# Patient Record
Sex: MALE | Race: BLACK OR AFRICAN AMERICAN | NOT HISPANIC OR LATINO | URBAN - METROPOLITAN AREA
[De-identification: names, ages, dates, MRNs, and addresses within clinical notes are randomized per-mention and may not be internally consistent; named-entity substitution may affect disease eponyms.]

---

## 2023-09-08 DIAGNOSIS — K40.90 RIGHT INGUINAL HERNIA: Primary | ICD-10-CM

## 2023-10-10 ENCOUNTER — OFFICE VISIT (OUTPATIENT)
Dept: SURGERY | Facility: CLINIC | Age: 38
End: 2023-10-10

## 2023-10-10 VITALS
HEART RATE: 58 BPM | SYSTOLIC BLOOD PRESSURE: 146 MMHG | HEIGHT: 66 IN | RESPIRATION RATE: 18 BRPM | DIASTOLIC BLOOD PRESSURE: 74 MMHG | BODY MASS INDEX: 20.44 KG/M2 | TEMPERATURE: 98 F | WEIGHT: 127.19 LBS | OXYGEN SATURATION: 97 %

## 2023-10-10 DIAGNOSIS — K40.90 RIGHT INGUINAL HERNIA: ICD-10-CM

## 2023-10-10 PROCEDURE — 99215 OFFICE O/P EST HI 40 MIN: CPT | Mod: PBBFAC

## 2023-10-10 RX ORDER — SODIUM CHLORIDE 9 MG/ML
INJECTION, SOLUTION INTRAVENOUS CONTINUOUS
Status: CANCELLED | OUTPATIENT
Start: 2023-10-10

## 2023-10-10 RX ORDER — CEFAZOLIN SODIUM 2 G/50ML
2 SOLUTION INTRAVENOUS
Status: CANCELLED | OUTPATIENT
Start: 2023-10-10

## 2023-10-10 RX ORDER — HEPARIN SODIUM 5000 [USP'U]/ML
5000 INJECTION, SOLUTION INTRAVENOUS; SUBCUTANEOUS ONCE
Status: CANCELLED | OUTPATIENT
Start: 2023-10-10

## 2023-10-10 NOTE — PROGRESS NOTES
Pt seen by Dr. Briggs; Scheduled for right inguinal hernia repair on 18Oct2023; Pt consented for surgery; TRES wipes & instruction sheet explained & given to pt; Pt instructed to return to clinic 2 weeks after surgery for post op appt; Discharge paperwork given w/pt verbalizing understanding; Pt escorted to financial building

## 2023-10-10 NOTE — H&P (VIEW-ONLY)
[facility] General [facility] Clinic Note    HPI: Demarcus Ayala is a 38 year old male with no significant past medical history who presents for evaluation of right inguinal hernia. 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 It has progressively gotten larger since then. He is able to reduce it by lying down and applying pressure. He reports some mild discomfort from his pants. No changes in appetite or bowel movements. No abdominal surgery before. Smokes 3 cigars per week but thinks he can quit. Does report left testicular enlargement that he has not seen any doctor for. Report RUQ pain with spicy foods about twice per week.     PMH: History reviewed. No pertinent past medical history.   Meds: No current outpatient medications on file.  Allergies:   Review of patient's allergies indicates:   Allergen Reactions    Tramadol Nausea Only     Social History:   Social History     Tobacco Use    Smoking status: Every Day     Types: Cigars, Cigarettes    Smokeless tobacco: Never    Tobacco comments:     Black & milds-3 per week   Substance Use Topics    Alcohol use: Yes     Comment: rarely    Drug use: Not Currently     Family History: History reviewed. No pertinent family history.  Surgical History: History reviewed. No pertinent surgical history.  Review of Systems:  Skin: No rashes or itching.  Head: Denies headache or recent trauma.  Eyes: Denies eye pain or double vision.  Neck: Denies swelling or hoarseness of voice.  Respiratory: Denies shortness of breath or chest pain  Cardiac: Denies palpitations or swelling in hands/feet.  Gastrointestinal: Denies nausea, denies vomiting.   Urinary: Denies dysuria or hematuria.  Vascular: Denies claudication or leg swelling.  Neuro: Denies motor deficits. Denies weakness.  Endocrine: Denies excessive sweating or cold intolerance.  Psych: Denies memory problems. Denies anxiety.    Objective:    Vitals:  Vitals:    10/10/23 0959   BP: (!) 146/74   Pulse: (!)  58   Resp: 18   Temp: 97.5 °F (36.4 °C)      Physical Exam:  Gen: NAD  Neuro: awake, alert, answering questions appropriately  CV: RRR  Resp: non-labored breathing, ROXANN  Abd: soft, ND, NT  : tangerine size right inguinal hernia extending into scrotum; easily reducibly, nontender  Ext: moves all 4 spontaneously and purposefully  Skin: warm, well perfused    Assessment/Plan:  Patient is a 38 year old male with moderate size right inguinal hernia who presents for evaluation for repair.      - Patient booked and consented for open right inguinal hernia repair 10/18  - NPO midnight prior to surgery  - Consider RUQ US to evaluate for symptomatic cholelithiasis after hernia repair    Flex Briggs MD   [employer] General Surgery PGY 1  10/10/2023 10:57 AM

## 2023-10-10 NOTE — PROGRESS NOTES
[facility] General Surgery Clinic Note    HPI: Demarcus Ayala is a 38 year old male with no significant past medical history who presents for evaluation of right inguinal hernia. 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 It has progressively gotten larger since then. He is able to reduce it by lying down and applying pressure. He reports some mild discomfort from his pants. No changes in appetite or bowel movements. No abdominal surgery before. Smokes 3 cigars per week but thinks he can quit. Does report left testicular enlargement that he has not seen any doctor for. Report RUQ pain with spicy foods about twice per week.     PMH: History reviewed. No pertinent past medical history.   Meds: No current outpatient medications on file.  Allergies:   Review of patient's allergies indicates:   Allergen Reactions    Tramadol Nausea Only     Social History:   Social History     Tobacco Use    Smoking status: Every Day     Types: Cigars, Cigarettes    Smokeless tobacco: Never    Tobacco comments:     Black & milds-3 per week   Substance Use Topics    Alcohol use: Yes     Comment: rarely    Drug use: Not Currently     Family History: History reviewed. No pertinent family history.  Surgical History: History reviewed. No pertinent surgical history.  Review of Systems:  Skin: No rashes or itching.  Head: Denies headache or recent trauma.  Eyes: Denies eye pain or double vision.  Neck: Denies swelling or hoarseness of voice.  Respiratory: Denies shortness of breath or chest pain  Cardiac: Denies palpitations or swelling in hands/feet.  Gastrointestinal: Denies nausea, denies vomiting.   Urinary: Denies dysuria or hematuria.  Vascular: Denies claudication or leg swelling.  Neuro: Denies motor deficits. Denies weakness.  Endocrine: Denies excessive sweating or cold intolerance.  Psych: Denies memory problems. Denies anxiety.    Objective:    Vitals:  Vitals:    10/10/23 0959   BP: (!) 146/74   Pulse: (!)  58   Resp: 18   Temp: 97.5 °F (36.4 °C)      Physical Exam:  Gen: NAD  Neuro: awake, alert, answering questions appropriately  CV: RRR  Resp: non-labored breathing, ROXANN  Abd: soft, ND, NT  : tangerine size right inguinal hernia extending into scrotum; easily reducibly, nontender  Ext: moves all 4 spontaneously and purposefully  Skin: warm, well perfused    Assessment/Plan:  Patient is a 38 year old male with moderate size right inguinal hernia who presents for evaluation for repair.      - Patient booked and consented for open right inguinal hernia repair 10/18  - NPO midnight prior to surgery  - Consider RUQ US to evaluate for symptomatic cholelithiasis after hernia repair    Flex Briggs MD   [employer] General Surgery PGY 1  10/10/2023 10:57 AM

## 2023-10-10 NOTE — PROGRESS NOTES
I have reviewed the notes, assessments, and/or procedures performed by the resident, I concur with her/his documentation of Demarcus Ayala.     Jayda Grant MD

## 2023-10-12 DIAGNOSIS — Q55.9 TESTICULAR ASYMMETRY: Primary | ICD-10-CM

## 2023-10-16 ENCOUNTER — ANESTHESIA EVENT (OUTPATIENT)
Dept: SURGERY | Facility: HOSPITAL | Age: 38
End: 2023-10-16

## 2023-10-16 NOTE — ANESTHESIA PREPROCEDURE EVALUATION
10/16/2023  Demarcus Ayala is a 38 y.o., male without significant PMHx for IHR right.      Pre-op Assessment    I have reviewed the Patient Summary Reports.     I have reviewed the Nursing Notes. I have reviewed the NPO Status.   I have reviewed the Medications.     Review of Systems  Anesthesia Hx:  No problems with previous Anesthesia  Denies Family Hx of Anesthesia complications.   Denies Personal Hx of Anesthesia complications.   Hematology/Oncology:  Hematology Normal   Oncology Normal     EENT/Dental:  EENT/Dental Normal  Ears General/Symptom(s) Ear Disease: Tympanic Membrane Problem   Cardiovascular:  Cardiovascular Normal     Pulmonary:  Pulmonary Normal    Renal/:  Renal/ Normal     Hepatic/GI:  Hepatic/GI Normal    Musculoskeletal:  Musculoskeletal Normal    Neurological:  Neurology Normal    Endocrine:  Endocrine Normal    Dermatological:  Skin Normal    Psych:  Psychiatric Normal           Physical Exam  General: Well nourished    Airway:  Mallampati: II   Mouth Opening: Normal  TM Distance: Normal  Tongue: Normal  Neck ROM: Normal ROM    Dental:Loose lower central incisor  Chest/Lungs:  Clear to auscultation, Normal Respiratory Rate    Heart:  Rate: Normal  Rhythm: Regular Rhythm        Anesthesia Plan  Type of Anesthesia, risks & benefits discussed:    Anesthesia Type: Gen Supraglottic Airway  Intra-op Monitoring Plan: Standard ASA Monitors  Post Op Pain Control Plan: multimodal analgesia and IV/PO Opioids PRN  Induction:  IV  Informed Consent: Informed consent signed with the Patient and all parties understand the risks and agree with anesthesia plan.  All questions answered.   ASA Score: 1  Day of Surgery Review of History & Physical: H&P Update referred to the surgeon/provider.I have interviewed and examined the patient. I have reviewed the patient's H&P dated: There are no significant  changes. H&P completed by Anesthesiologist.    Ready For Surgery From Anesthesia Perspective.     .

## 2023-10-18 ENCOUNTER — ANESTHESIA (OUTPATIENT)
Dept: SURGERY | Facility: HOSPITAL | Age: 38
End: 2023-10-18

## 2023-10-18 ENCOUNTER — HOSPITAL ENCOUNTER (OUTPATIENT)
Facility: HOSPITAL | Age: 38
Discharge: HOME | End: 2023-10-18
Attending: SURGERY | Admitting: SURGERY
Payer: MEDICAID

## 2023-10-18 DIAGNOSIS — K40.90 RIGHT INGUINAL HERNIA: ICD-10-CM

## 2023-10-18 PROCEDURE — 25000003 PHARM REV CODE 250: Performed by: NURSE ANESTHETIST, CERTIFIED REGISTERED

## 2023-10-18 PROCEDURE — 63600175 PHARM REV CODE 636 W HCPCS

## 2023-10-18 PROCEDURE — C1781 MESH (IMPLANTABLE): HCPCS | Performed by: SURGERY

## 2023-10-18 PROCEDURE — 37000008 HC ANESTHESIA 1ST 15 MINUTES: Performed by: SURGERY

## 2023-10-18 PROCEDURE — 88302 TISSUE EXAM BY PATHOLOGIST: CPT | Mod: TC | Performed by: SURGERY

## 2023-10-18 PROCEDURE — 71000033 HC RECOVERY, INTIAL HOUR: Performed by: SURGERY

## 2023-10-18 PROCEDURE — 25000003 PHARM REV CODE 250: Performed by: ANESTHESIOLOGY

## 2023-10-18 PROCEDURE — D9220A PRA ANESTHESIA: ICD-10-PCS | Mod: ,,, | Performed by: ANESTHESIOLOGY

## 2023-10-18 PROCEDURE — D9220A PRA ANESTHESIA: Mod: ,,, | Performed by: ANESTHESIOLOGY

## 2023-10-18 PROCEDURE — 63600175 PHARM REV CODE 636 W HCPCS: Performed by: NURSE ANESTHETIST, CERTIFIED REGISTERED

## 2023-10-18 PROCEDURE — 71000016 HC POSTOP RECOV ADDL HR: Performed by: SURGERY

## 2023-10-18 PROCEDURE — 36000707: Performed by: SURGERY

## 2023-10-18 PROCEDURE — C1729 CATH, DRAINAGE: HCPCS | Performed by: SURGERY

## 2023-10-18 PROCEDURE — 37000009 HC ANESTHESIA EA ADD 15 MINS: Performed by: SURGERY

## 2023-10-18 PROCEDURE — 36000706: Performed by: SURGERY

## 2023-10-18 PROCEDURE — 63600175 PHARM REV CODE 636 W HCPCS: Performed by: ANESTHESIOLOGY

## 2023-10-18 PROCEDURE — 63600175 PHARM REV CODE 636 W HCPCS: Performed by: SURGERY

## 2023-10-18 PROCEDURE — 71000015 HC POSTOP RECOV 1ST HR: Performed by: SURGERY

## 2023-10-18 DEVICE — BARD MESH
Type: IMPLANTABLE DEVICE | Site: ABDOMEN | Status: FUNCTIONAL
Brand: BARD MESH

## 2023-10-18 RX ORDER — HYDROMORPHONE HYDROCHLORIDE 1 MG/ML
INJECTION, SOLUTION INTRAMUSCULAR; INTRAVENOUS; SUBCUTANEOUS
Status: DISCONTINUED | OUTPATIENT
Start: 2023-10-18 | End: 2023-10-18

## 2023-10-18 RX ORDER — KETOROLAC TROMETHAMINE 30 MG/ML
INJECTION, SOLUTION INTRAMUSCULAR; INTRAVENOUS
Status: DISCONTINUED | OUTPATIENT
Start: 2023-10-18 | End: 2023-10-18

## 2023-10-18 RX ORDER — HEPARIN SODIUM 5000 [USP'U]/ML
5000 INJECTION, SOLUTION INTRAVENOUS; SUBCUTANEOUS ONCE
Status: COMPLETED | OUTPATIENT
Start: 2023-10-18 | End: 2023-10-18

## 2023-10-18 RX ORDER — METOCLOPRAMIDE HYDROCHLORIDE 5 MG/ML
10 INJECTION INTRAMUSCULAR; INTRAVENOUS ONCE AS NEEDED
Status: ACTIVE | OUTPATIENT
Start: 2023-10-18 | End: 2035-03-16

## 2023-10-18 RX ORDER — DROPERIDOL 2.5 MG/ML
0.25 INJECTION, SOLUTION INTRAMUSCULAR; INTRAVENOUS ONCE AS NEEDED
Status: ACTIVE | OUTPATIENT
Start: 2023-10-18 | End: 2035-03-16

## 2023-10-18 RX ORDER — BUPIVACAINE HYDROCHLORIDE 2.5 MG/ML
INJECTION, SOLUTION EPIDURAL; INFILTRATION; INTRACAUDAL
Status: DISCONTINUED | OUTPATIENT
Start: 2023-10-18 | End: 2023-10-18 | Stop reason: HOSPADM

## 2023-10-18 RX ORDER — FENTANYL CITRATE 50 UG/ML
INJECTION, SOLUTION INTRAMUSCULAR; INTRAVENOUS
Status: DISCONTINUED | OUTPATIENT
Start: 2023-10-18 | End: 2023-10-18

## 2023-10-18 RX ORDER — ONDANSETRON 2 MG/ML
INJECTION INTRAMUSCULAR; INTRAVENOUS
Status: DISCONTINUED | OUTPATIENT
Start: 2023-10-18 | End: 2023-10-18

## 2023-10-18 RX ORDER — OXYCODONE HYDROCHLORIDE 5 MG/1
5 TABLET ORAL
Status: DISPENSED | OUTPATIENT
Start: 2023-10-18

## 2023-10-18 RX ORDER — HYDROCODONE BITARTRATE AND ACETAMINOPHEN 7.5; 325 MG/1; MG/1
1 TABLET ORAL EVERY 6 HOURS PRN
Qty: 15 TABLET | Refills: 0 | Status: SHIPPED | OUTPATIENT
Start: 2023-10-18

## 2023-10-18 RX ORDER — DEXAMETHASONE SODIUM PHOSPHATE 4 MG/ML
INJECTION, SOLUTION INTRA-ARTICULAR; INTRALESIONAL; INTRAMUSCULAR; INTRAVENOUS; SOFT TISSUE
Status: DISCONTINUED | OUTPATIENT
Start: 2023-10-18 | End: 2023-10-18

## 2023-10-18 RX ORDER — SODIUM CHLORIDE 9 MG/ML
INJECTION, SOLUTION INTRAVENOUS CONTINUOUS
Status: DISCONTINUED | OUTPATIENT
Start: 2023-10-18 | End: 2023-10-18 | Stop reason: HOSPADM

## 2023-10-18 RX ORDER — PROPOFOL 10 MG/ML
INJECTION, EMULSION INTRAVENOUS
Status: DISCONTINUED | OUTPATIENT
Start: 2023-10-18 | End: 2023-10-18

## 2023-10-18 RX ORDER — SODIUM CHLORIDE, SODIUM LACTATE, POTASSIUM CHLORIDE, CALCIUM CHLORIDE 600; 310; 30; 20 MG/100ML; MG/100ML; MG/100ML; MG/100ML
INJECTION, SOLUTION INTRAVENOUS CONTINUOUS
Status: ACTIVE | OUTPATIENT
Start: 2023-10-18

## 2023-10-18 RX ORDER — HYDROMORPHONE HYDROCHLORIDE 1 MG/ML
0.2 INJECTION, SOLUTION INTRAMUSCULAR; INTRAVENOUS; SUBCUTANEOUS EVERY 5 MIN PRN
Status: DISPENSED | OUTPATIENT
Start: 2023-10-18

## 2023-10-18 RX ORDER — GLYCOPYRROLATE 0.2 MG/ML
INJECTION INTRAMUSCULAR; INTRAVENOUS
Status: DISCONTINUED | OUTPATIENT
Start: 2023-10-18 | End: 2023-10-18

## 2023-10-18 RX ORDER — LIDOCAINE HYDROCHLORIDE 20 MG/ML
INJECTION INTRAVENOUS
Status: DISCONTINUED | OUTPATIENT
Start: 2023-10-18 | End: 2023-10-18

## 2023-10-18 RX ORDER — DIPHENHYDRAMINE HYDROCHLORIDE 50 MG/ML
6.25 INJECTION INTRAMUSCULAR; INTRAVENOUS ONCE AS NEEDED
Status: ACTIVE | OUTPATIENT
Start: 2023-10-18 | End: 2035-03-16

## 2023-10-18 RX ORDER — MIDAZOLAM HYDROCHLORIDE 1 MG/ML
1 INJECTION INTRAMUSCULAR; INTRAVENOUS ONCE AS NEEDED
Status: COMPLETED | OUTPATIENT
Start: 2023-10-18 | End: 2023-10-18

## 2023-10-18 RX ORDER — LIDOCAINE HYDROCHLORIDE 10 MG/ML
1 INJECTION, SOLUTION EPIDURAL; INFILTRATION; INTRACAUDAL; PERINEURAL ONCE
Status: ACTIVE | OUTPATIENT
Start: 2023-10-18

## 2023-10-18 RX ORDER — CEFAZOLIN SODIUM 1 G/3ML
2 INJECTION, POWDER, FOR SOLUTION INTRAMUSCULAR; INTRAVENOUS
Status: COMPLETED | OUTPATIENT
Start: 2023-10-18 | End: 2023-10-18

## 2023-10-18 RX ORDER — KETAMINE HCL IN 0.9 % NACL 50 MG/5 ML
SYRINGE (ML) INTRAVENOUS
Status: DISCONTINUED | OUTPATIENT
Start: 2023-10-18 | End: 2023-10-18

## 2023-10-18 RX ADMIN — HYDROMORPHONE HYDROCHLORIDE 0.25 MG: 1 INJECTION, SOLUTION INTRAMUSCULAR; INTRAVENOUS; SUBCUTANEOUS at 10:10

## 2023-10-18 RX ADMIN — KETOROLAC TROMETHAMINE 30 MG: 30 INJECTION, SOLUTION INTRAMUSCULAR at 11:10

## 2023-10-18 RX ADMIN — HYDROMORPHONE HYDROCHLORIDE 0.2 MG: 0.5 INJECTION, SOLUTION INTRAMUSCULAR; INTRAVENOUS; SUBCUTANEOUS at 12:10

## 2023-10-18 RX ADMIN — HEPARIN SODIUM 5000 UNITS: 5000 INJECTION, SOLUTION INTRAVENOUS; SUBCUTANEOUS at 07:10

## 2023-10-18 RX ADMIN — PROPOFOL 125 MG: 10 INJECTION, EMULSION INTRAVENOUS at 09:10

## 2023-10-18 RX ADMIN — Medication 25 MG: at 09:10

## 2023-10-18 RX ADMIN — GLYCOPYRROLATE 0.2 MG: 0.2 INJECTION INTRAMUSCULAR; INTRAVENOUS at 09:10

## 2023-10-18 RX ADMIN — FENTANYL CITRATE 50 MCG: 50 INJECTION INTRAMUSCULAR; INTRAVENOUS at 09:10

## 2023-10-18 RX ADMIN — CEFAZOLIN 2 G: 330 INJECTION, POWDER, FOR SOLUTION INTRAMUSCULAR; INTRAVENOUS at 09:10

## 2023-10-18 RX ADMIN — LIDOCAINE HYDROCHLORIDE 50 MG: 20 INJECTION INTRAVENOUS at 11:10

## 2023-10-18 RX ADMIN — DEXAMETHASONE SODIUM PHOSPHATE 8 MG: 4 INJECTION, SOLUTION INTRA-ARTICULAR; INTRALESIONAL; INTRAMUSCULAR; INTRAVENOUS; SOFT TISSUE at 09:10

## 2023-10-18 RX ADMIN — OXYCODONE HYDROCHLORIDE 5 MG: 5 TABLET ORAL at 01:10

## 2023-10-18 RX ADMIN — MIDAZOLAM HYDROCHLORIDE 1 MG: 1 INJECTION, SOLUTION INTRAMUSCULAR; INTRAVENOUS at 08:10

## 2023-10-18 RX ADMIN — ONDANSETRON 4 MG: 2 INJECTION INTRAMUSCULAR; INTRAVENOUS at 11:10

## 2023-10-18 RX ADMIN — SODIUM CHLORIDE, POTASSIUM CHLORIDE, SODIUM LACTATE AND CALCIUM CHLORIDE: 600; 310; 30; 20 INJECTION, SOLUTION INTRAVENOUS at 08:10

## 2023-10-18 RX ADMIN — LIDOCAINE HYDROCHLORIDE 50 MG: 20 INJECTION INTRAVENOUS at 09:10

## 2023-10-18 NOTE — DISCHARGE INSTRUCTIONS
No heavy lifting >10 lbs x 6 weeks  Avoid straining or strenuous activity x 4 weeks  Incisions are closed with dissolving sutures on inside and dermabond skin glue on outside allow to come off by themselves  You may shower but no soaking, bathing or swimming x 2 weeks     Report the following to the clinic or go to ER with  - Temp greater than 100.4  - Pain which is worsening or unrelieved with pain meds  - Pus like drainage or redness around incisions     General surgery clinic number [phone] 8:00-5:00 pm after 5 call [phone] report that you have had surgery and are experiencing complications  Same day surgery [phone] open 8am-5pm

## 2023-10-18 NOTE — INTERVAL H&P NOTE
The patient has been examined and the H&P has been reviewed:    I concur with the findings and no changes have occurred since H&P was written.    Surgery risks, benefits and alternative options discussed and understood by patient/family.    -To OR for open right inguinal hernia repair    Flex Brigsg MD  [facility] General Surgery

## 2023-10-18 NOTE — ANESTHESIA PROCEDURE NOTES
Intubation    Date/Time: 10/18/2023 9:26 AM    Performed by: Gucci Head CRNA  Authorized by: Montse Alcala MD    Intubation:     Induction:  Intravenous    Intubated:  Postinduction    Mask Ventilation:  Easy mask    Attempts:  1    Attempted By:  CRNA    Difficult Airway Encountered?: No      Airway Device:  Supraglottic airway/LMA    Airway Device Size:  4.0    Style/Cuff Inflation:  Uncuffed    Placement Verified By:  Capnometry    Complicating Factors:  None, overbite and large prominent central incisors    Findings Post-Intubation:  BS equal bilateral and atraumatic/condition of teeth unchanged  Notes:      No leak @ 20 cm H2O

## 2023-10-18 NOTE — TRANSFER OF CARE
Anesthesia Transfer of Care Note    Patient: Demarcus Ayala    Procedure(s) Performed: Procedure(s) (LRB):  REPAIR, HERNIA, INGUINAL (Right)    Patient location: PACU    Anesthesia Type: general    Transport from OR: Transported from OR on room air with adequate spontaneous ventilation    Post pain: adequate analgesia    Post assessment: no apparent anesthetic complications and tolerated procedure well    Post vital signs: stable    Level of consciousness: sedated    Nausea/Vomiting: no nausea/vomiting    Complications: none    Transfer of care protocol was followedComments: Report to Saniya GOMEZ      Last vitals:   Visit Vitals  BP (!) 147/87   Pulse 68   Temp 36.3 °C (97.3 °F) (Temporal)   Resp 20   Wt 55 kg (121 lb 3.2 oz)   SpO2 100%   BMI 19.56 kg/m²

## 2023-10-18 NOTE — BRIEF OP NOTE
[facility] - Periop Services  Brief Operative Note    Surgery Date: 10/18/2023     Surgeon(s) and Role:     * Jayda Grant MD - Primary    Assisting Surgeon: None    Pre-op Diagnosis:  * No pre-op diagnosis entered *    Post-op Diagnosis:  Post-Op Diagnosis Codes:     * Right inguinal hernia [K40.90]    Procedure(s) (LRB):  REPAIR, HERNIA, INGUINAL (Right)    Anesthesia: General    Operative Findings: Right indirect inguinal hernia repaired with mesh    Estimated Blood Loss: 10 mL         Specimens:   Specimen (24h ago, onward)       Start     Ordered    10/18/23 1025  Specimen to Pathology  RELEASE UPON ORDERING        References:    Click here for ordering Quick Tip   Question:  Release to patient  Answer:  Immediate    10/18/23 1025                  Discharge Note    OUTCOME: Patient tolerated treatment/procedure well without complication and is now ready for discharge.    DISPOSITION: Home or Self Care    FINAL DIAGNOSIS:  <principal problem not specified>    FOLLOWUP: In clinic    DISCHARGE INSTRUCTIONS:    Discharge Procedure Orders   Diet Adult Regular     Lifting restrictions   Order Comments: No heavy lifting for 6-8 weeks.     Notify your health care provider if you experience any of the following:  temperature >100.4     Notify your health care provider if you experience any of the following:  persistent nausea and vomiting or diarrhea     Notify your health care provider if you experience any of the following:  severe uncontrolled pain     Notify your health care provider if you experience any of the following:  redness, tenderness, or signs of infection (pain, swelling, redness, odor or green/yellow discharge around incision site)     No dressing needed

## 2023-10-18 NOTE — ANESTHESIA POSTPROCEDURE EVALUATION
Anesthesia Post Evaluation    Patient: Demarcus Ayala    Procedure(s) Performed: Procedure(s) (LRB):  REPAIR, HERNIA, INGUINAL (Right)    Final Anesthesia Type: general      Patient location during evaluation: PACU  Patient participation: Yes- Able to Participate  Level of consciousness: awake and alert  Post-procedure vital signs: reviewed and stable  Pain management: adequate  Airway patency: patent    PONV status at discharge: No PONV  Anesthetic complications: no      Cardiovascular status: hemodynamically stable  Respiratory status: unassisted  Hydration status: euvolemic  Follow-up not needed.          Vitals Value Taken Time   /98 10/18/23 1316   Temp 36.5 °C (97.7 °F) 10/18/23 1225   Pulse 66 10/18/23 1338   Resp 15 10/18/23 1301   SpO2 96 % 10/18/23 1338   Vitals shown include unvalidated device data.      Event Time   Out of Recovery 12:22:00         Pain/Shanthi Score: Pain Rating Prior to Med Admin: 6 (10/18/2023  1:01 PM)  Shanthi Score: 10 (10/18/2023 12:25 PM)  Modified Shanthi Score: 20 (10/18/2023 12:25 PM)

## 2023-10-18 NOTE — OP NOTE
[facility]  General Surgery  Operative Note    SUMMARY     Date of Procedure: 10/18/2023     Procedure: Procedure(s) (LRB):  REPAIR, HERNIA, INGUINAL (Right)       Surgeon(s) and Role:     * Jayda Grant MD - Primary    Assisting Surgeon: None    Pre-Operative Diagnosis:   Right inguinal hernia    Post-Operative Diagnosis:   Indirect right inguinal hernia    Anesthesia: General    Operative Findings (including complications, if any):  Right indirect inguinal hernia sac isolated, suture ligated and closed with mesh    Description of Technical Procedures:   Patient was transported into the operating room after informed consent had bene obtained. He was placed on the operating table in a supine position. Right lower abdomen and groin were prepped and draped in standard sterile fashion. A 6cm incision was made one finger breadth medial and superior to the lateral edge of the pubic tubercle on the right towards the anterior superior iliac spine obliquely along eh's lines. The dermis was incised, the superficial inferior epigastric vein was identified and cauterized. Magalis's fascia was incised. The external oblique aponeurosis was cleaned of subcutaneous tissue exposing the superficial inguinal ring. Using a 15 blade, an incision was made along the fibers of the external oblique, this was extended to the superficial inguinal ring with metzenbaum scissors. The edges of the external oblique were everted and the undersurface of each was bluntly dissected off the spermatic cord. The ilioinguinal nerve was identified and protected. The spermatic cord was isolated and dissected off the inguinal ligament inferiorly, deshawn's ligament medially, and the conjoint tendon medially. The cord was circumferentially dissected at the level of deshawn's ligament and encircled with a penrose. The cremasteric fibers were then incised and the hernia sac was identified anteriomedially. The sac was then bluntly  dissected off the vas deferens, pampiniform plexus, and testicular artery. High ligation of the sac was performed with single ligation of it using 2-0 silk suture. The sac was then sent off for permanent pathology. Next a polypropylene mesh was cut to size and sewn in place at deshawn's ligament using 2-0 PDS suture. Simple interrupted 2-0 PDS sutures were then used to re-approximate the mesh to the inguinal ligament inferiorly and laterally. Simple interrupted 2-0 PDS sutures were also used to approximate the mesh medially and superiorly to the internal oblique and transversus abdominis. The leaflets of the mesh were encircled around the deep inguinal ring and spermatic cord and secured in place with 2-0 PDS suture. The deep ring was not too snug. Next 10cc of 0.25% bupivicaine was injected into the inguinal ligament, coopers ligament, and the internal oblique along the suture lines. The external oblique was then re-approximated with a simple running stitch using 3-0 vicryl suture. The superficial ring was re-created ensuring that the cord fit snug through it without excess tension. Magalis's fascia was re-approximated with a simple running 3-0 vicryl stitch. The dermis was anesthetized with 10cc 0.25% bupivicaine with epinephrine. A running subcuticular stitch was used to reapproximate the skin with 4-0 monocryl.    Estimated Blood Loss (EBL): 10 mL           Implants:   Implant Name Type Inv. Item Serial No.  Lot No. LRB No. Used Action   MESH KNITTED ST 3 X 6 - [id number]  MESH KNITTED ST 3 X 6  [employer] [place] [id number] Right 1 Implanted       Specimens:   Specimen (24h ago, onward)       Start     Ordered    10/18/23 1025  Specimen to Pathology  RELEASE UPON ORDERING        References:    Click here for ordering Quick Tip   Question:  Release to patient  Answer:  Immediate    10/18/23 1025                            Condition: Stable    Disposition: PACU - hemodynamically stable.    Flex Briggs MD  [employer]  General Surgery

## 2023-10-19 VITALS
TEMPERATURE: 97 F | BODY MASS INDEX: 19.56 KG/M2 | HEART RATE: 60 BPM | WEIGHT: 121.19 LBS | OXYGEN SATURATION: 97 % | DIASTOLIC BLOOD PRESSURE: 87 MMHG | SYSTOLIC BLOOD PRESSURE: 138 MMHG | RESPIRATION RATE: 18 BRPM

## 2023-10-19 LAB
ESTROGEN SERPL-MCNC: NORMAL PG/ML
INSULIN SERPL-ACNC: NORMAL U[IU]/ML
LAB AP CLINICAL INFORMATION: NORMAL
LAB AP GROSS DESCRIPTION: NORMAL
LAB AP REPORT FOOTNOTES: NORMAL
T3RU NFR SERPL: NORMAL %

## 2023-11-02 ENCOUNTER — OFFICE VISIT (OUTPATIENT)
Dept: SURGERY | Facility: CLINIC | Age: 38
End: 2023-11-02
Payer: MEDICAID

## 2023-11-02 VITALS
TEMPERATURE: 98 F | SYSTOLIC BLOOD PRESSURE: 133 MMHG | HEIGHT: 66 IN | DIASTOLIC BLOOD PRESSURE: 72 MMHG | RESPIRATION RATE: 18 BRPM | WEIGHT: 126 LBS | OXYGEN SATURATION: 96 % | HEART RATE: 71 BPM | BODY MASS INDEX: 20.25 KG/M2

## 2023-11-02 DIAGNOSIS — Q55.9 TESTICULAR ASYMMETRY: Primary | ICD-10-CM

## 2023-11-02 PROCEDURE — 99214 OFFICE O/P EST MOD 30 MIN: CPT | Mod: PBBFAC | Performed by: SURGERY

## 2023-11-02 NOTE — PROGRESS NOTES
[facility] GENERAL SURGERY   Clinic Note     HPI:   Demarcus Ayala is a 38 y.o. with no significant PMH presents to clinic for 2 week post op follow up for R. inguinal hernia repair. Patient doing well. He has been eating and drinking well with no pain. Patient has had multiple bowel movements without pain and has resumed normal bowel habits. Patient states that he has noticed a small bulge in his L. Inguinal region similar to the beginning stages of his recently repaired hernia. He noticed it within the past week and states that yesterday he felt a small amount of pain in the area. He denies fever, chills, SOB, chest pain, nausea, vomiting, diarrhea, or constipation at this time.    Review of Systems:  10 point review of systems denied unless otherwise indicated above HPI    Allergies:   Review of patient's allergies indicates:   Allergen Reactions    Tramadol Nausea Only       Meds:     Current Outpatient Medications:     HYDROcodone-acetaminophen (NORCO) 7.5-325 mg per tablet, Take 1 tablet by mouth every 6 (six) hours as needed for Pain., Disp: 15 tablet, Rfl: 0  No current facility-administered medications for this visit.    Facility-Administered Medications Ordered in Other Visits:     diphenhydrAMINE injection 6.25 mg, 6.25 mg, Intravenous, Once PRN, Montse Alcala MD    droPERidol injection 0.25 mg, 0.25 mg, Intravenous, Once PRN, Montse Alcala MD    HYDROmorphone injection 0.2 mg, 0.2 mg, Intravenous, Q5 Min PRN, Montse Alcala MD, 0.2 mg at 10/18/23 1213    lactated ringers infusion, , Intravenous, Continuous, Montse Alcala MD, Last Rate: 250 mL/hr at 10/18/23 0920, Rate Change at 10/18/23 0920    LIDOcaine (PF) 10 mg/ml (1%) injection 10 mg, 1 mL, Intradermal, Once, Montse Alcala MD    metoclopramide HCl injection 10 mg, 10 mg, Intravenous, Once PRN, Montse Alcala MD    oxyCODONE immediate release tablet 5 mg, 5 mg, Oral, Q3H PRN, Montse Alcala MD, 5 mg at 10/18/23 1301    PMH:   History  reviewed. No pertinent past medical history.     Social History:   Social History     Tobacco Use    Smoking status: Every Day     Types: Cigars, Cigarettes    Smokeless tobacco: Never    Tobacco comments:     Black & milds-3 per week   Substance Use Topics    Alcohol use: Yes     Comment: rarely    Drug use: Not Currently       Family History:   History reviewed. No pertinent family history.    Surgical History:   Past Surgical History:   Procedure Laterality Date    REPAIR, HERNIA, INGUINAL Right 10/18/2023    Procedure: REPAIR, HERNIA, INGUINAL;  Surgeon: Jayda Grant MD;  Location: [facility];  Service: General;  Laterality: Right;       Objective:  Vitals:  Vitals:    11/02/23 1116   BP: 133/72   Pulse: 71   Resp: 18   Temp: 97.9 °F (36.6 °C)        Physical Exam:  Gen: NAD  Neuro: awake, alert, answering questions appropriately  CV: RRR  Resp: non-labored breathing  Abd: soft, ND, NT. R. Inguinal incision healing appropriately. No pain or protrusion during examination L. Inguinal examination.  Ext: moves all 4 spontaneously and purposefully  Skin: warm, well perfused    Micro/Path/Other:  Right inguinal hernia sac, excision 10/18/23:  - Hernia sac.      Assessment/Plan:  Demarcus Ayala is a 38 y.o. with no significant PMHx of presents to clinic for post op follow up. Patient recovering appropriately. Patient amenable to scrotal US for workup of L. Inguinal concerns.    - Follow up 3 weeks with scrotal US    Nic Herrera MS3  11/02/2023 11:22 AM      Patient seen and examined with medical student.  Agree with above assessment and plan.    Patient post op RIHR.  Doing well, no issues  Left sided scrotal asymmetry, patient reports occasional bulge.  US ordered to eval hydrocele vs hernia.  Return to clinic with results.    Elroy Patel MD  [employer] General Surgery PGY6

## 2023-11-21 ENCOUNTER — HOSPITAL ENCOUNTER (OUTPATIENT)
Dept: RADIOLOGY | Facility: HOSPITAL | Age: 38
Discharge: HOME | End: 2023-11-21
Attending: STUDENT IN AN ORGANIZED HEALTH CARE EDUCATION/TRAINING PROGRAM
Payer: MEDICAID

## 2023-11-21 ENCOUNTER — OFFICE VISIT (OUTPATIENT)
Dept: SURGERY | Facility: CLINIC | Age: 38
End: 2023-11-21
Payer: MEDICAID

## 2023-11-21 VITALS
SYSTOLIC BLOOD PRESSURE: 130 MMHG | WEIGHT: 127.63 LBS | HEART RATE: 60 BPM | BODY MASS INDEX: 20.51 KG/M2 | OXYGEN SATURATION: 99 % | DIASTOLIC BLOOD PRESSURE: 78 MMHG | TEMPERATURE: 98 F | HEIGHT: 66 IN

## 2023-11-21 DIAGNOSIS — K40.90 LEFT INGUINAL HERNIA: Primary | ICD-10-CM

## 2023-11-21 DIAGNOSIS — Q55.9 TESTICULAR ASYMMETRY: ICD-10-CM

## 2023-11-21 PROCEDURE — 76870 US EXAM SCROTUM: CPT | Mod: TC

## 2023-11-21 PROCEDURE — 99213 OFFICE O/P EST LOW 20 MIN: CPT | Mod: PBBFAC

## 2023-11-21 NOTE — PROGRESS NOTES
[facility] Note    HPI:   Demarcus Ayala is a 38 y.o. patient who presents to clinic after right inguinal hernia repair 10/18 with complaint of a left inguinal bulge. He denies any pain associated with the left inguinal side but has noticed that it bulges when he strains, however, bulge reduces on its own. Patient does not report any fever, N/V, or urinary/BM issues. Right side inguinal incision looks to be healing well and patient has no tenderness along that area.     PMH: History reviewed. No pertinent past medical history.   Meds:   Current Outpatient Medications:     HYDROcodone-acetaminophen (NORCO) 7.5-325 mg per tablet, Take 1 tablet by mouth every 6 (six) hours as needed for Pain. (Patient not taking: Reported on 11/21/2023), Disp: 15 tablet, Rfl: 0  No current facility-administered medications for this visit.    Facility-Administered Medications Ordered in Other Visits:     diphenhydrAMINE injection 6.25 mg, 6.25 mg, Intravenous, Once PRN, Montse Alcala MD    droPERidol injection 0.25 mg, 0.25 mg, Intravenous, Once PRN, Montse Alcala MD    HYDROmorphone injection 0.2 mg, 0.2 mg, Intravenous, Q5 Min PRN, Montse Alcala MD, 0.2 mg at 10/18/23 1213    lactated ringers infusion, , Intravenous, Continuous, Montse Alcala MD, Last Rate: 250 mL/hr at 10/18/23 0920, Rate Change at 10/18/23 0920    LIDOcaine (PF) 10 mg/ml (1%) injection 10 mg, 1 mL, Intradermal, Once, Montse Alcala MD    metoclopramide HCl injection 10 mg, 10 mg, Intravenous, Once PRN, Montse Alcala MD    oxyCODONE immediate release tablet 5 mg, 5 mg, Oral, Q3H PRN, Montse Alcala MD, 5 mg at 10/18/23 1301  Allergies:   Review of patient's allergies indicates:   Allergen Reactions    Tramadol Nausea Only     Social History:   Social History     Tobacco Use    Smoking status: Every Day     Types: Cigarettes    Smokeless tobacco: Never    Tobacco comments:     Black & milds-3 per week   Substance Use Topics    Alcohol  use: Yes     Comment: rarely    Drug use: Not Currently     Family History: History reviewed. No pertinent family history.  Surgical History:   Past Surgical History:   Procedure Laterality Date    REPAIR, HERNIA, INGUINAL Right 10/18/2023    Procedure: REPAIR, HERNIA, INGUINAL;  Surgeon: Jayda Grant MD;  Location: [facility];  Service: General;  Laterality: Right;     Review of Systems:  Negative unless specified in HPI above.    Objective:    Vitals:  Vitals:    11/21/23 1307   BP: 130/78   Pulse: 60   Temp: 98.2 °F (36.8 °C)        Physical Exam:  Gen: NAD  Neuro: awake, alert, answering questions appropriately  Resp: non-labored breathing, ROXANN  Abd: soft, ND, NT  : Left inguinal bulge that does not extend into scrotum with coughing. Right inguinal incision is observed to be C/D/I.  Ext: moves all 4 spontaneously and purposefully  Skin: warm, well perfused    Pertinent Labs:  None     Imaging:  US 11/21: small 0.5 cm defect with herniating fat during straining     Micro/Path/Other:  Pathology 10/18: no abnormalities.    Assessment/Plan:  Demarcus Ayala is a 38 y.o. patient who presents to clinic after right inguinal hernia repair 10/18 with complaint of left inguinal hernia. Left inguinal hernia was palpated to be small with hydrocele involvement. Patient denies any recent fever, N/V, or urinary/BM issues.     -Patient doing well from post op right hernia  -Had at length discussion with patient regarding repair of left inguinal hernia.  Discussed risk of bowel herniation and subsequent strangulation or obstruction.  Given size of defect that is unlikely at this time although hernia may increase in size over time.  Discussed additional indication of pain for repair.  Explained risks of hernia repair including chronic pain and testicle loss.  Patient voiced understanding at this time and stated he would like to think about it further and will call clinic to set up appointment to schedule appointment if he  decides to have hernia repaired.  -Follow up PRN or call clinic to schedule follow up appointment for left inguinal hernia repair if desired.     Mica Varghese, MS3      Patient seen and examined.  Medical student assessment and plan edited.     Elroy Patel MD  [facility] PGY6

## 2023-11-21 NOTE — LETTER
November 21, 2023      [facility]  [address] [facility] [address]  Phone: [phone]       Patient: Demarcus Ayala   YOB: 1985  Date of Visit: 11/21/2023    To Whom It May Concern:    Cornelio Ayala  was at [facility] on 11/21/2023. He underwent a surgical procedure requiring weight restrictions until 11/27. The patient may return to work/school on 11/27 with no restrictions. If you have any questions or concerns, or if I can be of further assistance, please do not hesitate to contact me.    Sincerely,    Elroy Patel MD

## (undated) DEVICE — GLOVE PROTEXIS NEOPRENE 7.5

## (undated) DEVICE — GLOVE SENSICARE PI GRN 6.5

## (undated) DEVICE — GLOVE SIGNATURE MICRO LTX 7

## (undated) DEVICE — GOWN SMARTSLEEVE AAMI LVL4 LG

## (undated) DEVICE — ADHESIVE DERMABOND ADVANCED

## (undated) DEVICE — SOL 9P NACL IRR PIC IL

## (undated) DEVICE — SUT SILK 2.0 BLK 18

## (undated) DEVICE — GLOVE SIGNATURE MICRO LTX 6

## (undated) DEVICE — SUT SA85H SILK 2-0

## (undated) DEVICE — DRAIN PENRS STERILE LTX 18X1/2

## (undated) DEVICE — GOWN POLY REINF BRTH SLV XL

## (undated) DEVICE — SPONGE KITTNER 1/4X 5/8 L STRL

## (undated) DEVICE — GLOVE SIGNATURE ESSNTL LTX 6

## (undated) DEVICE — MANIFOLD 4 PORT

## (undated) DEVICE — GLOVE SENSICARE PI GRN 8

## (undated) DEVICE — NDL HYPO REG 25G X 1 1/2

## (undated) DEVICE — YANKAUER FLEX NO VENT REG CAP

## (undated) DEVICE — SUT 3/0 27IN PDS II VIO MO

## (undated) DEVICE — SUT Z338H PDSII 3-0

## (undated) DEVICE — GLOVE SENSICARE PI GRN 7

## (undated) DEVICE — SUT MCRYL PLUS 4-0 PS2 27IN

## (undated) DEVICE — GLOVE SENSICARE PI GRN 7.5

## (undated) DEVICE — APPLICATOR CHLORAPREP ORN 26ML

## (undated) DEVICE — CLIPPER BLADE MOD 4406 (CAREF)

## (undated) DEVICE — GLOVE SIGNATURE MICRO LTX 6.5

## (undated) DEVICE — SYR 10CC LUER LOCK

## (undated) DEVICE — Device

## (undated) DEVICE — KIT SURGICAL TURNOVER

## (undated) DEVICE — GOWN POLY REINF X-LONG 2XL

## (undated) DEVICE — SUT 3-0 VICRYL / SH (J416)